# Patient Record
Sex: FEMALE | Race: WHITE | NOT HISPANIC OR LATINO
[De-identification: names, ages, dates, MRNs, and addresses within clinical notes are randomized per-mention and may not be internally consistent; named-entity substitution may affect disease eponyms.]

---

## 2020-07-23 PROBLEM — Z00.00 ENCOUNTER FOR PREVENTIVE HEALTH EXAMINATION: Status: ACTIVE | Noted: 2020-07-23

## 2020-11-04 ENCOUNTER — APPOINTMENT (OUTPATIENT)
Dept: GASTROENTEROLOGY | Facility: CLINIC | Age: 65
End: 2020-11-04
Payer: COMMERCIAL

## 2020-11-04 VITALS
BODY MASS INDEX: 20.99 KG/M2 | WEIGHT: 126 LBS | OXYGEN SATURATION: 99 % | TEMPERATURE: 97.3 F | HEART RATE: 67 BPM | HEIGHT: 65 IN | SYSTOLIC BLOOD PRESSURE: 124 MMHG | DIASTOLIC BLOOD PRESSURE: 72 MMHG | RESPIRATION RATE: 16 BRPM

## 2020-11-04 DIAGNOSIS — R14.0 ABDOMINAL DISTENSION (GASEOUS): ICD-10-CM

## 2020-11-04 DIAGNOSIS — Z86.2 PERSONAL HISTORY OF DISEASES OF THE BLOOD AND BLOOD-FORMING ORGANS AND CERTAIN DISORDERS INVOLVING THE IMMUNE MECHANISM: ICD-10-CM

## 2020-11-04 DIAGNOSIS — Z78.9 OTHER SPECIFIED HEALTH STATUS: ICD-10-CM

## 2020-11-04 DIAGNOSIS — Z86.39 PERSONAL HISTORY OF OTHER ENDOCRINE, NUTRITIONAL AND METABOLIC DISEASE: ICD-10-CM

## 2020-11-04 DIAGNOSIS — K57.30 DIVERTICULOSIS OF LARGE INTESTINE W/OUT PERFORATION OR ABSCESS W/OUT BLEEDING: ICD-10-CM

## 2020-11-04 DIAGNOSIS — R10.32 LEFT LOWER QUADRANT PAIN: ICD-10-CM

## 2020-11-04 PROCEDURE — 99203 OFFICE O/P NEW LOW 30 MIN: CPT | Mod: 25

## 2020-11-04 PROCEDURE — 99072 ADDL SUPL MATRL&STAF TM PHE: CPT

## 2020-11-04 RX ORDER — ROSUVASTATIN CALCIUM 10 MG/1
10 TABLET, FILM COATED ORAL
Refills: 0 | Status: ACTIVE | COMMUNITY

## 2020-11-04 NOTE — PHYSICAL EXAM
[General Appearance - Alert] : alert [General Appearance - In No Acute Distress] : in no acute distress [Sclera] : the sclera and conjunctiva were normal [Outer Ear] : the ears and nose were normal in appearance [Auscultation Breath Sounds / Voice Sounds] : lungs were clear to auscultation bilaterally [Heart Rate And Rhythm] : heart rate was normal and rhythm regular [Heart Sounds] : normal S1 and S2 [Heart Sounds Gallop] : no gallops [Murmurs] : no murmurs [Heart Sounds Pericardial Friction Rub] : no pericardial rub [Edema] : there was no peripheral edema [Bowel Sounds] : normal bowel sounds [Abdomen Soft] : soft [] : no hepato-splenomegaly [Abdomen Mass (___ Cm)] : no abdominal mass palpated [LLQ] : in the left lower quadrant [Skin Color & Pigmentation] : normal skin color and pigmentation [No Focal Deficits] : no focal deficits

## 2020-11-04 NOTE — ASSESSMENT
[FreeTextEntry1] : This is a 65-year-old female with chronic abdominal bloating, distention, abdominal discomfort with constipation. She is suffering from constipation predominant irritable bowel syndrome and most probable lactose intolerance. I explained the above to the patient at length. I recommend a two-week trial on a lactose-free diet. If no improvement, she will proceed with a two-week trial on a gluten-free diet. I asked her copy of her recent within one month colonoscopy report faxed to me. She has been experiencing left lower quadrant abdominal pain and is tender to palpation. There is a possibility of mild acute diverticulitis. This is separate from her gas and bloating symptoms. GYN examination within the past year reported to be unremarkable. I recommend CT of the pelvis. In the meantime she will have mostly liquid diet and low fiber diet until results of CAT scan returns or if her abdominal pain improves. She is to call me if she has questions or concerns.

## 2020-11-04 NOTE — HISTORY OF PRESENT ILLNESS
[FreeTextEntry1] : This is a 65-year-old female with history hyperlipidemia presenting with symptoms of bloating and constipation. She reports for years to have bloating with abdominal distention Causing abdominal discomfort and pain. She admits to having dairy products. She likes yogurt and notices that sometimes when she has cheese it upsets her stomach. She reports chronic constipation. She reports having bowel movements every day or every other day but usually consists of hard stools. She denies associated rectal bleeding or melena. She denies weight loss or anemia. She reports having blood work performed within the past year reported to be normal. For the past few months she has been having intermittent left lower quadrant abdominal pain, 3/10 in nature and tender to palpation. No fevers or chills. She reports undergoing colonoscopy a month ago by colorectal surgeon (part of Dr Hearn's group) reported with diverticulosis otherwise no polyps. She has been getting colonoscopies every 5 years. She denies family history of colon cancer. After the colonoscopy a month ago, she was told that she had "spasms" in the left side of the colon and was told that she would need a repeat colonoscopy sooner than 5 years. I asked a copy of the colonoscopy report faxed to me. She reports having seen her gynecologist within the past year and having a transvaginal ultrasound reported to be normal.

## 2020-12-01 ENCOUNTER — APPOINTMENT (OUTPATIENT)
Dept: CT IMAGING | Facility: CLINIC | Age: 65
End: 2020-12-01
Payer: COMMERCIAL

## 2020-12-01 ENCOUNTER — OUTPATIENT (OUTPATIENT)
Dept: OUTPATIENT SERVICES | Facility: HOSPITAL | Age: 65
LOS: 1 days | End: 2020-12-01
Payer: COMMERCIAL

## 2020-12-01 PROCEDURE — 72193 CT PELVIS W/DYE: CPT

## 2020-12-01 PROCEDURE — 72193 CT PELVIS W/DYE: CPT | Mod: 26

## 2020-12-01 PROCEDURE — 82565 ASSAY OF CREATININE: CPT

## 2020-12-07 ENCOUNTER — APPOINTMENT (OUTPATIENT)
Dept: ULTRASOUND IMAGING | Facility: CLINIC | Age: 65
End: 2020-12-07
Payer: COMMERCIAL

## 2020-12-07 ENCOUNTER — OUTPATIENT (OUTPATIENT)
Dept: OUTPATIENT SERVICES | Facility: HOSPITAL | Age: 65
LOS: 1 days | End: 2020-12-07
Payer: COMMERCIAL

## 2020-12-07 DIAGNOSIS — R10.2 PELVIC AND PERINEAL PAIN: ICD-10-CM

## 2020-12-07 DIAGNOSIS — Z00.8 ENCOUNTER FOR OTHER GENERAL EXAMINATION: ICD-10-CM

## 2020-12-07 PROCEDURE — 76770 US EXAM ABDO BACK WALL COMP: CPT | Mod: 26

## 2020-12-07 PROCEDURE — 76770 US EXAM ABDO BACK WALL COMP: CPT

## 2020-12-11 ENCOUNTER — NON-APPOINTMENT (OUTPATIENT)
Age: 65
End: 2020-12-11

## 2021-05-12 ENCOUNTER — APPOINTMENT (OUTPATIENT)
Dept: GASTROENTEROLOGY | Facility: CLINIC | Age: 66
End: 2021-05-12

## 2023-01-09 ENCOUNTER — APPOINTMENT (OUTPATIENT)
Dept: GASTROENTEROLOGY | Facility: CLINIC | Age: 68
End: 2023-01-09
Payer: MEDICARE

## 2023-01-09 VITALS
OXYGEN SATURATION: 98 % | BODY MASS INDEX: 20.83 KG/M2 | TEMPERATURE: 97 F | HEART RATE: 67 BPM | DIASTOLIC BLOOD PRESSURE: 78 MMHG | SYSTOLIC BLOOD PRESSURE: 125 MMHG | HEIGHT: 65 IN | WEIGHT: 125 LBS

## 2023-01-09 DIAGNOSIS — K59.09 OTHER CONSTIPATION: ICD-10-CM

## 2023-01-09 PROCEDURE — 99213 OFFICE O/P EST LOW 20 MIN: CPT

## 2023-01-09 NOTE — HISTORY OF PRESENT ILLNESS
[FreeTextEntry1] : Bria presents for follow-up visit.  She relates that she has been taking align daily, Metamucil 1 packet daily and has better bowel movements.  She currently has bowel movements every other day consisting of soft stools without pushing or straining.  She continues with intermittent left lower quad abdominal pain occurring less than once a week and lasting a few minutes.  The pain is not associated with bowel movements or feeling like she has to have a bowel movement.  She still has some gas and bloating.  She sees GYN on a regular basis.  She underwent a colonoscopy, results in chart, November 2020 with fair colonic preparation and redundant colon.  She states that she will go back to Dr. Sadaf Vásquez to have a repeat colonoscopy this year as recommended by him.  For the abdominal pain she underwent CT of pelvis with contrast December 2020 showing large stool burden, diverticulosis without diverticulitis, otherwise unremarkable exam.  She did have left-sided hydronephrosis and was recommended to undergo a renal ultrasound.  Renal ultrasound with mild bilateral hydronephrosis without obstruction.  She did not see a urologist as I had recommended.

## 2023-01-09 NOTE — PHYSICAL EXAM

## 2023-01-09 NOTE — REASON FOR VISIT
[Follow-up] : a follow-up of an existing diagnosis [FreeTextEntry1] : LLQ pain, bloating, constipation

## 2023-01-09 NOTE — ASSESSMENT
[FreeTextEntry1] : This is a pleasant 67-year-old female with chronic intermittent left lower quad abdominal pain, bloating and constipation.  Symptoms of constipation have improved on align and 1 packet Metamucil daily.  She can try increasing Metamucil packet to twice a day or take MiraLAX nightly.  She does not remember the effect of the MiraLAX from 2020.  I recommend close follow-up with her gynecologist.  She states that she will have colonoscopy performed by her colorectal surgeon Dr. Mina Vásquez sometime this year.  She is to call if there is any questions or concerns otherwise I will see her for follow-up visit in 4 to 6 months.

## 2023-07-10 ENCOUNTER — APPOINTMENT (OUTPATIENT)
Dept: GASTROENTEROLOGY | Facility: CLINIC | Age: 68
End: 2023-07-10

## 2023-08-04 ENCOUNTER — APPOINTMENT (OUTPATIENT)
Dept: ORTHOPEDIC SURGERY | Facility: CLINIC | Age: 68
End: 2023-08-04
Payer: MEDICARE

## 2023-08-04 VITALS — BODY MASS INDEX: 20.83 KG/M2 | WEIGHT: 125 LBS | HEIGHT: 65 IN

## 2023-08-04 DIAGNOSIS — S92.352A DISPLACED FRACTURE OF FIFTH METATARSAL BONE, LEFT FOOT, INITIAL ENCOUNTER FOR CLOSED FRACTURE: ICD-10-CM

## 2023-08-04 PROCEDURE — 28470 CLTX METATARSAL FX WO MNP EA: CPT | Mod: LT

## 2023-08-04 PROCEDURE — L4361: CPT | Mod: KX,LT

## 2023-08-04 PROCEDURE — 73630 X-RAY EXAM OF FOOT: CPT | Mod: LT

## 2023-08-04 PROCEDURE — 99214 OFFICE O/P EST MOD 30 MIN: CPT | Mod: 25

## 2023-08-04 NOTE — REASON FOR VISIT
[FreeTextEntry2] : Twisted left foot 3 day ago  after stepping off curb.. Presently with pain and swelling. X rays done at [facility] same day

## 2023-08-04 NOTE — PLAN
[TextEntry] : The patient was advised of the diagnosis. The natural history of the pathology was explained in full to the patient in layman's terms. All questions were answered. The risks and benefits of surgical and non-surgical treatment alternatives were explained in full to the patient. Fitted with short cam boot.  Patient may weightbear as tolerated.

## 2023-08-04 NOTE — PHYSICAL EXAM
[Normal Mood and Affect] : normal mood and affect [Able to Communicate] : able to communicate [Well Developed] : well developed [Well Nourished] : well nourished [] : full range of motion of ankle [5___] : eversion 5[unfilled]/5 [Left] : left foot [Fracture] : Fracture [FreeTextEntry3] : Swelling and ecchymosis DL foot [de-identified] : small avulsion fx off base of the 5th MT

## 2023-08-04 NOTE — HISTORY OF PRESENT ILLNESS
[3] : 3 [1] : 2 [Dull/Aching] : dull/aching [Intermittent] : intermittent [Rest] : rest [Sitting] : sitting [Full time] : Work status: full time [de-identified] : 8/4/23 Return visit for this 67-year-old female who stepped off a curb in [facility] x 3 days ago and rolled her lt foot. C/o pain and swelling. Limping. Was seen at a local hospital ER where xrays revealed a fx. Placed in tall cam boot, WBAT. [] : no [FreeTextEntry1] : left foot [FreeTextEntry9] : Cam Boot [de-identified] : 8/1/23 [de-identified] : [facility]

## 2023-08-25 ENCOUNTER — APPOINTMENT (OUTPATIENT)
Dept: ORTHOPEDIC SURGERY | Facility: CLINIC | Age: 68
End: 2023-08-25
Payer: MEDICARE

## 2023-08-25 PROCEDURE — 73630 X-RAY EXAM OF FOOT: CPT | Mod: LT

## 2023-08-25 PROCEDURE — 99024 POSTOP FOLLOW-UP VISIT: CPT

## 2023-08-25 NOTE — PHYSICAL EXAM
[Normal Mood and Affect] : normal mood and affect [Able to Communicate] : able to communicate [Well Developed] : well developed [Well Nourished] : well nourished [5___] : eversion 5[unfilled]/5 [Left] : left foot [Fracture] : Fracture [] : full range of motion of foot [FreeTextEntry3] : Swelling and ecchymosis DL foot [de-identified] : small avulsion fx off base of the 5th MT

## 2023-08-25 NOTE — PLAN
[TextEntry] : The patient was advised of the diagnosis. The natural history of the pathology was explained in full to the patient in layman's terms. All questions were answered. The risks and benefits of surgical and non-surgical treatment alternatives were explained in full to the patient. Continue boot x 1 week more, then transition to normal jogging sneaker.  Patient may weightbear as tolerated.

## 2023-08-25 NOTE — HISTORY OF PRESENT ILLNESS
[de-identified] : 08/01/23 INJURY  08/25/23:  Is now 24 days after left 5th MT fracture. She states recovery is going well with minimal pain. Wearing cam boot.  8/4/23 Return visit for this 67-year-old female who stepped off a curb in [facility] x 3 days ago and rolled her lt foot. C/o pain and swelling. Limping. Was seen at a local hospital ER where xrays revealed a fx. Placed in tall cam boot, WBAT.

## 2023-09-15 ENCOUNTER — APPOINTMENT (OUTPATIENT)
Dept: ORTHOPEDIC SURGERY | Facility: CLINIC | Age: 68
End: 2023-09-15
Payer: MEDICARE

## 2023-09-15 VITALS — WEIGHT: 123 LBS | BODY MASS INDEX: 20.49 KG/M2 | HEIGHT: 65 IN

## 2023-09-15 DIAGNOSIS — S92.352D DISPLACED FRACTURE OF FIFTH METATARSAL BONE, LEFT FOOT, SUBSEQUENT ENCOUNTER FOR FRACTURE WITH ROUTINE HEALING: ICD-10-CM

## 2023-09-15 PROCEDURE — 99024 POSTOP FOLLOW-UP VISIT: CPT

## 2023-09-15 PROCEDURE — 73630 X-RAY EXAM OF FOOT: CPT | Mod: LT

## 2023-09-19 ENCOUNTER — APPOINTMENT (OUTPATIENT)
Dept: ORTHOPEDIC SURGERY | Facility: CLINIC | Age: 68
End: 2023-09-19